# Patient Record
Sex: FEMALE | Race: WHITE | NOT HISPANIC OR LATINO
[De-identification: names, ages, dates, MRNs, and addresses within clinical notes are randomized per-mention and may not be internally consistent; named-entity substitution may affect disease eponyms.]

---

## 2022-07-17 ENCOUNTER — NON-APPOINTMENT (OUTPATIENT)
Age: 27
End: 2022-07-17

## 2023-01-10 ENCOUNTER — NON-APPOINTMENT (OUTPATIENT)
Age: 28
End: 2023-01-10

## 2023-06-12 ENCOUNTER — NON-APPOINTMENT (OUTPATIENT)
Age: 28
End: 2023-06-12

## 2023-06-26 ENCOUNTER — APPOINTMENT (OUTPATIENT)
Dept: OTOLARYNGOLOGY | Facility: CLINIC | Age: 28
End: 2023-06-26
Payer: COMMERCIAL

## 2023-06-26 ENCOUNTER — NON-APPOINTMENT (OUTPATIENT)
Age: 28
End: 2023-06-26

## 2023-06-26 VITALS
WEIGHT: 125 LBS | DIASTOLIC BLOOD PRESSURE: 74 MMHG | HEIGHT: 60 IN | BODY MASS INDEX: 24.54 KG/M2 | HEART RATE: 83 BPM | SYSTOLIC BLOOD PRESSURE: 110 MMHG

## 2023-06-26 DIAGNOSIS — J02.9 ACUTE PHARYNGITIS, UNSPECIFIED: ICD-10-CM

## 2023-06-26 DIAGNOSIS — J00 ACUTE NASOPHARYNGITIS [COMMON COLD]: ICD-10-CM

## 2023-06-26 PROCEDURE — 92557 COMPREHENSIVE HEARING TEST: CPT

## 2023-06-26 PROCEDURE — 92567 TYMPANOMETRY: CPT

## 2023-06-26 PROCEDURE — 92511 NASOPHARYNGOSCOPY: CPT

## 2023-06-26 PROCEDURE — 99204 OFFICE O/P NEW MOD 45 MIN: CPT | Mod: 25

## 2023-06-26 RX ORDER — CEPHALEXIN 500 MG/1
500 CAPSULE ORAL
Qty: 14 | Refills: 0 | Status: ACTIVE | COMMUNITY
Start: 2023-06-26 | End: 1900-01-01

## 2023-06-26 RX ORDER — AZELASTINE HYDROCHLORIDE AND FLUTICASONE PROPIONATE 137; 50 UG/1; UG/1
137-50 SPRAY, METERED NASAL
Qty: 1 | Refills: 6 | Status: ACTIVE | COMMUNITY
Start: 2023-06-26 | End: 1900-01-01

## 2023-06-26 NOTE — PHYSICAL EXAM
[Hearing Crystal Test (Tuning Fork On Forehead)] : no lateralization of tone [Midline] : trachea located in midline position [Removed] : palatine tonsils previously removed [Normal] : no rashes [de-identified] : intact, slightly retracted [de-identified] : fluid in middle ear [FreeTextEntry1] : -mild deviation of the septum along the floor b/l\par -mildly inflamed turbinates [de-identified] : mildly inflamed posterior pharyngeal wall [FreeTextEntry2] : sensations intact. sinuses nontender to percussion

## 2023-06-26 NOTE — ASSESSMENT
[FreeTextEntry1] : Reviewed and reconciled medications, allergies, PMHx, PSHx, SocHx, FMHx \par \par Patient presents stating that since the end of May she has been feeling a lot of pressure and fullness in her left ear. She states she noticed her hearing has changed in the left ear as well. She denies having a cold before this happening. She states she feels cracking in both ears every once in a while and she gets occasional ringing in her left ear. She states she tried taking Mucinex but it didn’t help. She uses sinus rinse and gets clear drainage. She states she woke up with a weird feeling in her chest and throat this morning. Patient has a history of Chron's Disease.\par \par Physical Exam:\par -Right Ear: TM intact, slightly retracted\par -Left Ear: fluid middle ear\par -mildly inflamed posterior pharyngeal wall\par -no tonsils\par -mild deviation of the septum along the floor b/l\par -mildly inflamed turbinates\par \par Flexible Nasal Endoscopy:\par -nasopharynx looks normal\par -clear mucus\par \par Audio: left serious otitis with hearing loss\par -right ear: 100% at 50 dB\par -left ear: 1005 at 75 dB\par - TYPE A TYMP IN RIGHT, TYPE B IN LEFT\par RIGHT: -8000HZ\par LEFT: MILD CONDUCTIVE LOSS 250-8000HZ \par \par Plan: Flexible Nasal Endoscopy. Audio - results interpreted by Dr. Anaya and reviewed with the patient. Will not put on steroid due to Chron's. Start Cephalexin. Start Dymista - 1 spray bilaterally BID, spray laterally. Take otc Clartin or Allegra. FU 10 days \par

## 2023-06-26 NOTE — HISTORY OF PRESENT ILLNESS
[de-identified] : Patient presents stating that since the end of May she has been feeling a lot of pressure and fullness in her left ear. She states she noticed her hearing has changed in the left ear as well. She denies having a cold before this happening. She states she feels cracking in both ears every once in a while and she gets occasional ringing in her left ear. She states she tried taking Mucinex but it didn’t help. She uses sinus rinse and gets clear drainage. She states she woke up with a weird feeling in her chest and throat this morning.  Patient has a history of Chron's Disease.

## 2023-06-26 NOTE — REASON FOR VISIT
[Initial Consultation] : an initial consultation for [FreeTextEntry2] : sinus pressure/ ear pressure

## 2023-06-26 NOTE — CONSULT LETTER
[Dear  ___] : Dear  [unfilled], [Consult Letter:] : I had the pleasure of evaluating your patient, [unfilled]. [Please see my note below.] : Please see my note below. [Consult Closing:] : Thank you very much for allowing me to participate in the care of this patient.  If you have any questions, please do not hesitate to contact me. [Sincerely,] : Sincerely, [FreeTextEntry1] : Dayday Anaya MD FACS

## 2023-06-26 NOTE — PROCEDURE
[Recalcitrant Symptoms] : recalcitrant symptoms  [FreeTextEntry6] : Flexible Nasal Endoscopy:\par -nasopharynx looks normal\par -clear mucus

## 2023-06-26 NOTE — REVIEW OF SYSTEMS
[Sneezing] : sneezing [Seasonal Allergies] : seasonal allergies [Ear Pain] : ear pain [Ear Itch] : ear itch [Ear Noises] : ear noises [Hoarseness] : hoarseness [Throat Clearing] : throat clearing [Swollen Glands] : swollen glands [Negative] : Endocrine

## 2023-06-26 NOTE — DATA REVIEWED
[de-identified] : - TYPE A TYMP IN RIGHT, TYPE B IN LEFT\par RIGHT: -8000HZ\par LEFT: MILD CONDUCTIVE LOSS 250-8000HZ

## 2023-07-10 ENCOUNTER — APPOINTMENT (OUTPATIENT)
Dept: OTOLARYNGOLOGY | Facility: CLINIC | Age: 28
End: 2023-07-10
Payer: COMMERCIAL

## 2023-07-10 VITALS
WEIGHT: 125 LBS | HEART RATE: 72 BPM | HEIGHT: 60 IN | BODY MASS INDEX: 24.54 KG/M2 | SYSTOLIC BLOOD PRESSURE: 112 MMHG | DIASTOLIC BLOOD PRESSURE: 77 MMHG

## 2023-07-10 DIAGNOSIS — J34.2 DEVIATED NASAL SEPTUM: ICD-10-CM

## 2023-07-10 DIAGNOSIS — J31.0 CHRONIC RHINITIS: ICD-10-CM

## 2023-07-10 DIAGNOSIS — H69.82 OTHER SPECIFIED DISORDERS OF EUSTACHIAN TUBE, LEFT EAR: ICD-10-CM

## 2023-07-10 DIAGNOSIS — H65.02 ACUTE SEROUS OTITIS MEDIA, LEFT EAR: ICD-10-CM

## 2023-07-10 PROCEDURE — 92557 COMPREHENSIVE HEARING TEST: CPT

## 2023-07-10 PROCEDURE — 92567 TYMPANOMETRY: CPT

## 2023-07-10 PROCEDURE — 99213 OFFICE O/P EST LOW 20 MIN: CPT

## 2023-07-10 NOTE — DATA REVIEWED
[de-identified] : abnormal ETF AU; AD: Type C/A, AS: Type C\par hearing is WNL, 250-8000 Hz AU with mild  Hz, AS\par REC: ENT f/u, re-eval per MD\par

## 2023-07-10 NOTE — PHYSICAL EXAM
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 sensations intact.

## 2023-07-10 NOTE — CONSULT LETTER
[Dear  ___] : Dear  [unfilled], [Courtesy Letter:] : I had the pleasure of seeing your patient, [unfilled], in my office today. [Please see my note below.] : Please see my note below. [Consult Closing:] : Thank you very much for allowing me to participate in the care of this patient.  If you have any questions, please do not hesitate to contact me. [Sincerely,] : Sincerely, [FreeTextEntry3] : Dayday Anaya MD FACS

## 2023-07-10 NOTE — ASSESSMENT
[FreeTextEntry1] : Reviewed and reconciled medications, allergies, PMHx, PSHx, SocHx, FMHx.\par \par Pt presents with h/o fluid left ear presents today for follow up on ears. Pt notes her ears feel unclogged and she heard a little crackling, muffled hearing seems to have resolved\par \par Physical Exam -\par left ear cerumen removed, retraction pocket\par \par Audio:\par abnormal ETF AU; AD: Type C/A, AS: Type C\par hearing is WNL, 250-8000 Hz AU with mild  Hz, AS\par \par Plan: \par Audio - results interpreted by Dr. Anaya and reviewed with the patient. Can continue using Dymista. Afrin 30 minutes before flying. FU 6-8 weeks.

## 2023-07-10 NOTE — ADDENDUM
[FreeTextEntry1] : Documented by Ana Maria acting as scribe for Dr. Anaya on 07/10/2023.\par \par All Medical record entries made by the scribe were at my, Dr. Anaya,direction and personally dictated by me on 07/10/2023. I have reviewed the chart and agree that the record accurately reflects my personal performance of the history, physical exam, assessment and plan. I have also personally directed, reviewed, and agreed with the discharge instructions.

## 2023-08-21 ENCOUNTER — APPOINTMENT (OUTPATIENT)
Dept: OTOLARYNGOLOGY | Facility: CLINIC | Age: 28
End: 2023-08-21

## 2024-11-11 DIAGNOSIS — Z31.69 ENCOUNTER FOR OTHER GENERAL COUNSELING AND ADVICE ON PROCREATION: ICD-10-CM

## 2024-11-11 DIAGNOSIS — K50.90 CROHN'S DISEASE, UNSPECIFIED, W/OUT COMPLICATIONS: ICD-10-CM
